# Patient Record
Sex: FEMALE | Race: WHITE
[De-identification: names, ages, dates, MRNs, and addresses within clinical notes are randomized per-mention and may not be internally consistent; named-entity substitution may affect disease eponyms.]

---

## 2018-06-21 ENCOUNTER — HOSPITAL ENCOUNTER (OUTPATIENT)
Dept: HOSPITAL 41 - JD.SDS | Age: 68
Discharge: HOME | End: 2018-06-21
Attending: OPHTHALMOLOGY
Payer: MEDICARE

## 2018-06-21 DIAGNOSIS — H02.834: ICD-10-CM

## 2018-06-21 DIAGNOSIS — Z79.82: ICD-10-CM

## 2018-06-21 DIAGNOSIS — H16.103: ICD-10-CM

## 2018-06-21 DIAGNOSIS — Z79.899: ICD-10-CM

## 2018-06-21 DIAGNOSIS — M19.90: ICD-10-CM

## 2018-06-21 DIAGNOSIS — H25.813: Primary | ICD-10-CM

## 2018-06-21 DIAGNOSIS — H16.223: ICD-10-CM

## 2018-06-21 DIAGNOSIS — Z83.518: ICD-10-CM

## 2018-06-21 DIAGNOSIS — H02.831: ICD-10-CM

## 2018-06-21 DIAGNOSIS — E78.00: ICD-10-CM

## 2018-06-21 DIAGNOSIS — I10: ICD-10-CM

## 2018-06-21 PROCEDURE — 66984 XCAPSL CTRC RMVL W/O ECP: CPT

## 2018-06-21 PROCEDURE — V2632 POST CHMBR INTRAOCULAR LENS: HCPCS

## 2018-06-21 RX ADMIN — TETRACAINE HYDROCHLORIDE SCH ML: 5 SOLUTION OPHTHALMIC at 09:56

## 2018-06-21 RX ADMIN — POLYMYXIN B SULFATE AND TRIMETHOPRIM SULFATE SCH DROP: 10000; 1 SOLUTION/ DROPS OPHTHALMIC at 08:54

## 2018-06-21 RX ADMIN — TROPICAMIDE SCH DROP: 10 SOLUTION/ DROPS OPHTHALMIC at 08:38

## 2018-06-21 RX ADMIN — POLYMYXIN B SULFATE AND TRIMETHOPRIM SULFATE SCH ML: 10000; 1 SOLUTION/ DROPS OPHTHALMIC at 10:05

## 2018-06-21 RX ADMIN — TETRACAINE HYDROCHLORIDE SCH DROP: 5 SOLUTION OPHTHALMIC at 09:35

## 2018-06-21 RX ADMIN — TROPICAMIDE SCH DROP: 10 SOLUTION/ DROPS OPHTHALMIC at 08:28

## 2018-06-21 RX ADMIN — TROPICAMIDE SCH DROP: 10 SOLUTION/ DROPS OPHTHALMIC at 08:48

## 2018-06-21 RX ADMIN — TROPICAMIDE SCH DROP: 10 SOLUTION/ DROPS OPHTHALMIC at 09:10

## 2018-06-21 RX ADMIN — POLYMYXIN B SULFATE AND TRIMETHOPRIM SULFATE SCH DROP: 10000; 1 SOLUTION/ DROPS OPHTHALMIC at 08:13

## 2018-06-21 NOTE — PCM.PREANE
Preanesthetic Assessment





- Procedure


Proposed Procedure: 





cataract left





- Anesthesia/Transfusion/Family Hx


Anesthesia History: Prior Anesthesia Reaction


Type of Anesthesia Reaction: Excessive Nausea/Vomiting


Family History of Anesthesia Reaction: No


Transfusion History: No Prior Transfusion(s)





- Review of Systems


General: No Symptoms


Pulmonary: No Symptoms


Cardiovascular: No Symptoms


Gastrointestinal: No Symptoms


Neurological: No Symptoms


Other: Reports: None





- Physical Assessment


NPO Status Date: 06/20/18


NPO Status Time: 22:00


Pulse: 80


O2 Sat by Pulse Oximetry: 94


Respiratory Rate: 16


Blood Pressure: 135/90


Temperature: 97 F


Height: 5 ft 2 in


Weight: 69.4 kg


ASA Class: 2


Mental Status: Alert & Oriented x3


Airway Class: Mallampati = 1


Dentition: Reports: Normal Dentition


Thyro-Mental Finger Breadths: 3


Mouth Opening Finger Breadths: 3


ROM/Head Extension: Full


Lungs: Clear to Auscultation, Normal Respiratory Effort


Cardiovascular: Regular Rate, Regular Rhythm





- Allergies


Allergies/Adverse Reactions: 


 Allergies











Allergy/AdvReac Type Severity Reaction Status Date / Time


 


No Known Allergies Allergy   Verified 06/19/18 11:15














- Blood


Blood Available: No





- Acknowledgements


Anesthesia Type Planned: MAC


Pt an Appropriate Candidate for the Planned Anesthesia: Yes


Alternatives and Risks of Anesthesia Discussed w Pt/Guardian: Yes


Pt/Guardian Understands and Agrees with Anesthesia Plan: Yes





PreAnesthesia Questionnaire


Cardiovascular History: Reports: High Cholesterol, Hypertension


Respiratory History: Reports: None





- Past Surgical History


HEENT Surgical History: Reports: Other (See Below) (tear duct)


GI Surgical History: Reports: Cholecystectomy


Musculoskeletal Surgical History: Reports: Shoulder Surgery, Other (See Below) (

bunionectomy)





- SUBSTANCE USE


Smoking Status *Q: Never Smoker


Tobacco Use Within Last Twelve Months: No


Second Hand Smoke Exposure: No


Days Per Week of Alcohol Use: 7


Number of Drinks Per Day: 1


Total Drinks Per Week: 7


Recreational Drug Use History: No





- HOME MEDS


Home Medications: 


 Home Meds





Aspirin [Adult Low Dose Aspirin EC] 81 mg PO DAILY 06/20/18 [History]


Lisinopril 20 mg PO DAILY 06/20/18 [History]


Multivitamin with Minerals [Multiple Vitamin] 1 tab PO DAILY 06/20/18 [History]


Simvastatin [Zocor] 40 mg PO DAILY 06/20/18 [History]











- CURRENT (IN HOUSE) MEDS


Current Meds: 





 Current Medications





Brimonidine Tartrate (Alphagan 0.2% Ophth Soln)  0 ml EYELF ASDIRECTED YANI


   Stop: 06/21/18 18:00


   Last Admin: 06/21/18 08:18 Dose:  1 drop


Cefuroxime Sodium (Zinacef)  0 mg EYELF ASDIRECTED YANI


   Stop: 06/21/18 18:00


Lidocaine HCl (Xylocaine-Mpf 1%)  10 ml INJECT ASDIRECTED YANI


   Stop: 06/21/18 18:00


Phenylephrine HCl (Rafael-Synephrine 2.5% Ophth Soln)  0 ml EYELF ASDIRECTED YANI


   Stop: 06/21/18 18:00


   Last Admin: 06/21/18 08:23 Dose:  1 drop


Pilocarpine HCl (Pilocar 4% Ophth Soln)  0 ml EYELF ASDIRECTED YANI


   Stop: 06/21/18 18:00


Polymyxin/Trimethoprim Sulfate (Polytrim Ophth Soln)  0 ml EYELF ASDIRECTED YANI


   Stop: 06/21/18 18:00


   Last Admin: 06/21/18 08:13 Dose:  1 drop


Tetracaine HCl (Tetracaine 0.5% Steri-Unit Sol)  0 ml EYELF ASDIRECTED YANI


   Stop: 06/21/18 18:00


Tropicamide (Mydriacyl 1% Ophth Soln)  0 ml EYELF ASDIRECTED YANI


   Stop: 06/21/18 18:00

## 2018-06-21 NOTE — [id number]
Post Anesthesia Note





- EVALUATION WITHIN 48HRS OF ANESTHETIC


Vital Signs in Normal Range: Yes


Patient Participated in Evaluation: Yes


Respiratory Function Stable: Yes


Airway Patent: Yes


Cardiovascular Function Stable: Yes


Hydration Status Stable: Yes


Pain Control Satisfactory: Yes


Nausea and Vomiting Control Satisfactory: Yes


Mental Status Recovered: Yes


Pulse Rate: 73


SaO2: 94


Resp Rate: 16


Temperature: 97 F


Blood Pressure: 131/83

## 2018-07-19 ENCOUNTER — HOSPITAL ENCOUNTER (OUTPATIENT)
Dept: HOSPITAL 41 - JD.SDS | Age: 68
Discharge: HOME | End: 2018-07-19
Attending: OPHTHALMOLOGY
Payer: MEDICARE

## 2018-07-19 DIAGNOSIS — Z79.899: ICD-10-CM

## 2018-07-19 DIAGNOSIS — Z98.42: ICD-10-CM

## 2018-07-19 DIAGNOSIS — Z79.82: ICD-10-CM

## 2018-07-19 DIAGNOSIS — I10: ICD-10-CM

## 2018-07-19 DIAGNOSIS — Z98.890: ICD-10-CM

## 2018-07-19 DIAGNOSIS — Z83.518: ICD-10-CM

## 2018-07-19 DIAGNOSIS — M19.90: ICD-10-CM

## 2018-07-19 DIAGNOSIS — H02.831: ICD-10-CM

## 2018-07-19 DIAGNOSIS — Z96.1: ICD-10-CM

## 2018-07-19 DIAGNOSIS — E78.00: ICD-10-CM

## 2018-07-19 DIAGNOSIS — H16.103: ICD-10-CM

## 2018-07-19 DIAGNOSIS — H02.834: ICD-10-CM

## 2018-07-19 DIAGNOSIS — H16.223: ICD-10-CM

## 2018-07-19 DIAGNOSIS — H25.811: Primary | ICD-10-CM

## 2018-07-19 PROCEDURE — 66984 XCAPSL CTRC RMVL W/O ECP: CPT

## 2018-07-19 PROCEDURE — C1780 LENS, INTRAOCULAR (NEW TECH): HCPCS

## 2018-07-19 RX ADMIN — PHENYLEPHRINE HYDROCHLORIDE SCH DROP: 25 SOLUTION OPHTHALMIC at 11:35

## 2018-07-19 RX ADMIN — PHENYLEPHRINE HYDROCHLORIDE SCH DROP: 25 SOLUTION OPHTHALMIC at 11:28

## 2018-07-19 RX ADMIN — TETRACAINE HYDROCHLORIDE SCH ML: 5 SOLUTION OPHTHALMIC at 12:44

## 2018-07-19 RX ADMIN — POLYMYXIN B SULFATE AND TRIMETHOPRIM SULFATE SCH ML: 10000; 1 SOLUTION/ DROPS OPHTHALMIC at 12:49

## 2018-07-19 RX ADMIN — POLYMYXIN B SULFATE AND TRIMETHOPRIM SULFATE SCH DROP: 10000; 1 SOLUTION/ DROPS OPHTHALMIC at 10:57

## 2018-07-19 RX ADMIN — TETRACAINE HYDROCHLORIDE SCH DROP: 5 SOLUTION OPHTHALMIC at 12:13

## 2018-07-19 RX ADMIN — PHENYLEPHRINE HYDROCHLORIDE SCH DROP: 25 SOLUTION OPHTHALMIC at 11:21

## 2018-07-19 RX ADMIN — PHENYLEPHRINE HYDROCHLORIDE SCH DROP: 25 SOLUTION OPHTHALMIC at 12:05

## 2018-07-19 RX ADMIN — PHENYLEPHRINE HYDROCHLORIDE SCH DROP: 25 SOLUTION OPHTHALMIC at 12:06

## 2018-07-19 RX ADMIN — PHENYLEPHRINE HYDROCHLORIDE SCH DROP: 25 SOLUTION OPHTHALMIC at 11:08

## 2018-07-19 RX ADMIN — PHENYLEPHRINE HYDROCHLORIDE SCH DROP: 25 SOLUTION OPHTHALMIC at 11:14

## 2018-07-19 RX ADMIN — POLYMYXIN B SULFATE AND TRIMETHOPRIM SULFATE SCH DROP: 10000; 1 SOLUTION/ DROPS OPHTHALMIC at 11:50

## 2018-07-19 RX ADMIN — PHENYLEPHRINE HYDROCHLORIDE SCH DROP: 25 SOLUTION OPHTHALMIC at 12:08

## 2018-07-19 RX ADMIN — PHENYLEPHRINE HYDROCHLORIDE SCH DROP: 25 SOLUTION OPHTHALMIC at 11:41

## 2018-07-19 NOTE — [id number]
Post Anesthesia Note





- EVALUATION WITHIN 48HRS OF ANESTHETIC


Vital Signs in Normal Range: Yes


Patient Participated in Evaluation: Yes


Respiratory Function Stable: Yes


Airway Patent: Yes


Cardiovascular Function Stable: Yes


Hydration Status Stable: Yes


Pain Control Satisfactory: Yes


Nausea and Vomiting Control Satisfactory: Yes


Mental Status Recovered: Yes


Pulse Rate: 71


SaO2: 94


Resp Rate: 16


Blood Pressure: 140/82

## 2018-07-19 NOTE — PCM.PREANE
Preanesthetic Assessment





- Anesthesia/Transfusion/Family Hx


Anesthesia History: Prior Anesthesia Reaction


Type of Anesthesia Reaction: Excessive Nausea/Vomiting


Family History of Anesthesia Reaction: No


Transfusion History: No Prior Transfusion(s)





- Review of Systems


General: No Symptoms


Pulmonary: No Symptoms


Cardiovascular: No Symptoms


Gastrointestinal: No Symptoms


Neurological: No Symptoms


Other: Reports: None





- Physical Assessment


NPO Status Date: 07/18/18


NPO Status Time: 22:30


O2 Sat by Pulse Oximetry: 94


Respiratory Rate: 16


Vital Signs: 





 Last Vital Signs











Temp  36.6 C   07/19/18 10:45


 


Pulse  73   07/19/18 10:45


 


Resp  16   07/19/18 10:45


 


BP  143/97 H  07/19/18 10:45


 


Pulse Ox  94 L  07/19/18 10:45











Height: 1.57 m


Weight: 68.946 kg


ASA Class: 2


Mental Status: Alert & Oriented x3


Airway Class: Mallampati = 1


Dentition: Reports: Normal Dentition


Thyro-Mental Finger Breadths: 3


Mouth Opening Finger Breadths: 3


ROM/Head Extension: Full


Lungs: Clear to Auscultation, Normal Respiratory Effort


Cardiovascular: Regular Rate, Regular Rhythm





- Allergies


Allergies/Adverse Reactions: 


 Allergies











Allergy/AdvReac Type Severity Reaction Status Date / Time


 


No Known Allergies Allergy   Verified 07/18/18 11:17














- Acknowledgements


Anesthesia Type Planned: MAC


Pt an Appropriate Candidate for the Planned Anesthesia: Yes


Alternatives and Risks of Anesthesia Discussed w Pt/Guardian: Yes


Pt/Guardian Understands and Agrees with Anesthesia Plan: Yes





PreAnesthesia Questionnaire


Cardiovascular History: Reports: High Cholesterol, Hypertension


Respiratory History: Reports: None





- Past Surgical History


HEENT Surgical History: Reports: Other (See Below) (tear duct)


GI Surgical History: Reports: Cholecystectomy


Musculoskeletal Surgical History: Reports: Shoulder Surgery, Other (See Below) (

bunionectomy)





- HOME MEDS


Home Medications: 


 Home Meds





Aspirin [Adult Low Dose Aspirin EC] 81 mg PO DAILY 06/20/18 [History]


Lisinopril 20 mg PO DAILY 06/20/18 [History]


Multivitamin with Minerals [Multiple Vitamin] 1 tab PO DAILY 06/20/18 [History]


Simvastatin [Zocor] 40 mg PO DAILY 06/20/18 [History]











- CURRENT (IN HOUSE) MEDS


Current Meds: 





 Current Medications





Brimonidine Tartrate (Alphagan 0.2% Ophth Soln)  0 ml EYERT ASDIRECTED YANI


   Stop: 07/19/18 18:00


   Last Admin: 07/19/18 11:01 Dose:  1 drop


Cefuroxime Sodium (Zinacef)  0 mg EYERT ASDIRECTED YANI


   Stop: 07/19/18 18:00


Lidocaine HCl (Xylocaine-Mpf 1%)  0 ml INJECT ASDIRECTED YANI


   Stop: 07/19/18 18:00


Phenylephrine HCl (Rafael-Synephrine 2.5% Ophth Soln)  0 ml EYERT ASDIRECTED YANI


   Stop: 07/19/18 18:00


   Last Admin: 07/19/18 11:14 Dose:  1 drop


Pilocarpine HCl (Pilocar 4% Ophth Soln)  0 ml EYERT ASDIRECTED YANI


   Stop: 07/19/18 18:00


Polymyxin/Trimethoprim Sulfate (Polytrim Ophth Soln)  0 ml EYERT ASDIRECTED YANI


   Stop: 07/19/18 18:00


   Last Admin: 07/19/18 10:57 Dose:  1 drop


Tetracaine HCl (Tetracaine 0.5% Steri-Unit Sol)  0 ml EYERT ASDIRECTED YANI


   Stop: 07/19/18 18:00


Tropicamide (Mydriacyl 1% Ophth Soln)  0 ml EYERT ASDIRECTED YANI


   Stop: 07/19/18 18:00





Discontinued Medications





Phenylephrine HCl (Rafael-Synephrine 2.5% Ophth Soln)  0 ml EYERT ASDIRECTED YANI


   Stop: 07/19/18 18:00

## 2021-03-04 NOTE — [id number]
Post Anesthesia Note





- EVALUATION WITHIN 48HRS OF ANESTHETIC


Vital Signs in Normal Range: Yes


Patient Participated in Evaluation: Yes


Respiratory Function Stable: Yes


Airway Patent: Yes


Cardiovascular Function Stable: Yes


Hydration Status Stable: Yes


Pain Control Satisfactory: Yes


Nausea and Vomiting Control Satisfactory: Yes


Mental Status Recovered: Yes


Vital Signs: 


                                Last Vital Signs











Temp  97.8 F   03/04/21 07:10


 


Pulse  78   03/04/21 07:10


 


Resp  16   03/04/21 07:10


 


BP  130/85   03/04/21 07:10


 


Pulse Ox  97   03/04/21 07:10








0857


120/75


72


20


97.9


95%

## 2021-03-04 NOTE — PCM.PREANE
Preanesthetic Assessment





- Procedure


Proposed Procedure: 





bilateral carpal tunnel release





- Anesthesia/Transfusion/Family Hx


Anesthesia History: Prior Anesthesia Reaction


Family History of Anesthesia Reaction: No


Transfusion History: No Prior Transfusion(s)





- Review of Systems


General: No Symptoms


Pulmonary: No Symptoms


Cardiovascular: No Symptoms


Gastrointestinal: No Symptoms


Neurological: No Symptoms


Other: Reports: None





- Physical Assessment


NPO Status Date: 03/03/21


NPO Status Time: 23:30


Vital Signs: 





130/85


78


97%


16


97.8


Height: 5 ft 2 in


Weight: 72.6 kg


ASA Class: 2


Mental Status: Alert & Oriented x3


Airway Class: Mallampati = 1


Dentition: Reports: Normal Dentition


Thyro-Mental Finger Breadths: 3


Mouth Opening Finger Breadths: 3


ROM/Head Extension: Full


Lungs: Clear to Auscultation, Normal Respiratory Effort


Cardiovascular: Regular Rate, Regular Rhythm





- Allergies


Allergies/Adverse Reactions: 


                                    Allergies











Allergy/AdvReac Type Severity Reaction Status Date / Time


 


No Known Allergies Allergy   Verified 03/03/21 11:57














- Blood


Blood Available: No





- Acknowledgements


Anesthesia Type Planned: NIMA, MAC


Pt an Appropriate Candidate for the Planned Anesthesia: Yes


Alternatives and Risks of Anesthesia Discussed w Pt/Guardian: Yes


Pt/Guardian Understands and Agrees with Anesthesia Plan: Yes





PreAnesthesia Questionnaire


HEENT History: Reports: Other (See Below)


Other HEENT History: TEAR DUCT SURGERY, WEARS GLASSES


Cardiovascular History: Reports: High Cholesterol, Hypertension, Other (See 

Below)


Other Cardiovascular History: PVCS, HEART CATH


Respiratory History: Reports: Sleep Apnea (snores)


Gastrointestinal History: Reports: None


Genitourinary History: Reports: None


OB/GYN History: Reports: None


Musculoskeletal History: Reports: Other (See Below)


Other Musculoskeletal History: bilateral carpal tunnel syndrome, arthritis


Neurological History: Reports: None


Psychiatric History: Reports: None


Endocrine/Metabolic History: Reports: Other (See Below)


Other Endocrine/Metabolic History: right bunionectomy, right foot surgery, 

bilateral knee replacements, right rotator cuff repair


Hematologic History: Reports: None


Immunologic History: Reports: None


Oncologic (Cancer) History: Reports: None


Dermatologic History: Reports: None





- Infectious Disease History


Infectious Disease History: Reports: None





- Past Surgical History


Head Surgeries/Procedures: Reports: None


HEENT Surgical History: Reports: Cataract Surgery, Other (See Below)


Cardiovascular Surgical History: Reports: None


Respiratory Surgical History: Reports: None


GI Surgical History: Reports: Cholecystectomy, Colonoscopy


Female  Surgical History: Reports: None


Male  Surgical History: Reports: None


Endocrine Surgical History: Reports: None


Neurological Surgical History: Reports: None


Musculoskeletal Surgical History: Reports: Knee Replacement, Shoulder Surgery, 

Other (See Below) (fusion right foot)


Oncologic Surgical History: Reports: None





- SUBSTANCE USE


Tobacco Use Status *Q: Never Tobacco User


Tobacco Use Within Last Twelve Months: No


Second Hand Smoke Exposure: No


Days Per Week of Alcohol Use: 2


Number of Drinks Per Day: 1


Total Drinks Per Week: 2


Recreational Drug Use History: No





- HOME MEDS


Home Medications: 


                                    Home Meds





Aspirin [Adult Low Dose Aspirin EC] 81 mg PO DAILY 06/20/18 [History]


Lisinopril 20 mg PO DAILY 06/20/18 [History]


Multivitamin with Minerals [Multiple Vitamin] 1 tab PO DAILY 06/20/18 [History]


Simvastatin [Zocor] 40 mg PO DAILY 06/20/18 [History]


Ascorbic Acid [Vitamin C] 1,000 mg PO DAILY 03/03/21 [History]


Biotin 1 mg PO DAILY 03/03/21 [History]


Calcium Carb/Vitamin D3/Vit K1 [Calcium + D Soft Chewable Tab] 1 tab PO DAILY 

03/03/21 [History]


Cholecalciferol (Vitamin D3) [Vitamin D3] 1,000 unit PO DAILY 03/03/21 [History]


Fish Oil/Omega-3 Fatty Acids [Fish Oil 1,000 MG] 1 gm PO DAILY 03/03/21 

[History]


Melatonin 10 mg PO DAILY 03/03/21 [History]


Triamcinolone Acetonide [Triamcinolone Acetonide 0.1% Crm] 1 dose TOP BID PRN 

03/03/21 [History]


Ubidecarenone [Coq-10] 100 mg PO DAILY 03/03/21 [History]


Vitamin B Complex [B Complex] 1 tab PO DAILY 03/03/21 [History]











- CURRENT (IN HOUSE) MEDS


Current Meds: 





                               Current Medications





Lactated Ringer's (Ringers, Lactated)  1,000 mls @ 125 mls/hr IV ASDIRECTED YANI


   Stop: 03/04/21 23:00


Lidocaine/Sodium Bicarbonate (Buffered Lidocaine 1% In Ns 8.4%)  0.25 ml IDERM 

ONETIME PRN


   PRN Reason: Prior to IV Start


   Stop: 03/04/21 18:00


Sodium Chloride (Saline Flush)  10 ml FLUSH ASDIRECTED PRN


   PRN Reason: Keep Vein Open


   Stop: 03/04/21 18:00





Discontinued Medications





Cefazolin Sodium (Ancef) Confirm Administered Dose 2 gm .ROUTE .STK-MED ONE


   Stop: 03/04/21 07:09


Fentanyl (Sublimaze) Confirm Administered Dose 100 mcg .ROUTE .STK-MED ONE


   Stop: 03/04/21 07:05


Lidocaine HCl (Xylocaine-Mpf 1%) Confirm Administered Dose 4 mls @ as directed 

.ROUTE .STK-MED ONE


   Stop: 03/04/21 07:05


Midazolam HCl (Versed 1 Mg/Ml) Confirm Administered Dose 2 mg .ROUTE .STK-MED 

ONE


   Stop: 03/04/21 07:05


Propofol (Diprivan  20 Ml) Confirm Administered Dose 200 mg .ROUTE .STK-MED ONE


   Stop: 03/04/21 07:05

## 2021-03-05 NOTE — OR
DATE OF OPERATION:  03/04/2021

 

SURGEON:  Scot Dejesus MD

 

PREOPERATIVE DIAGNOSIS:

1. Right carpal tunnel syndrome, G56.01.

2. Left carpal tunnel syndrome, G56.02.

 

POSTOPERATIVE DIAGNOSIS:

1. Right carpal tunnel syndrome, G56.01.

2. Left carpal tunnel syndrome, G56.02.

 

OPERATION PERFORMED:

1. Right open carpal tunnel release, 30882.

2. Left carpal tunnel injection, 20526.

 

FIRST ASSISTANT:

Valerie London.

 

DESCRIPTION OF PROCEDURE:

The patient is a very pleasant 70-year-old female with symptomatic bilateral

carpal tunnel syndrome.  After discussing the risks, benefits, and alternatives

to both conservative as well as surgical treatment, the patient verbalized

understanding and wished to proceed with surgery.

 

The patient was brought to the operating room and underwent a hermila blockade on

the right side.  The right upper extremity was then prepped and draped in

standard orthopedic fashion.  Surgical pause was performed identifying the

appropriate patient. Sharp dissection was carried out through skin and

subcutaneous tissue.  Hemostasis was obtained.  We divided the palmar fascial

fibers down to the transverse carpal ligament.  Utilizing a [place] blade, we

opened the ulnar border of the transverse carpal ligament.  We then utilized the

dissecting scissors.  We split the transverse carpal ligament down to the level

of the superficial palmar arch.  We turned our attention proximally where we

released the remainder of the transverse carpal ligament.  We then elevated the

soft tissues over the volar aspect of the antebrachial fascia.  We then split

the fascia 3 to 4 cm proximal to the wrist crease.  Exam of the carpal tunnel

again showed fusiform compression at the level of the transverse carpal ligament

- antebrachial fascia junction.  She had a modest synovitis.  Skin was closed

with 5-0 nylon.  She was placed in a soft dressing and brought to Recovery in

satisfactory condition.

 

Her left wrist was sterilely prepped.  We then sterilely injected 1 mL of

[place], 3 mL of 0.25% bupivacaine under sterile conditions without

complication to the carpal tunnel.

 

ANESTHESIA:

 

 

ESTIMATED BLOOD LOSS:

 

 

DD:  03/04/2021 13:49:03

DT:  03/04/2021 18:57:49  MMODAL

Job #:  [id number]

## 2021-11-04 NOTE — PCM.PREANE
Preanesthetic Assessment





- Procedure


Proposed Procedure: 





Left carpal tunnel release








- Anesthesia/Transfusion/Family Hx


Anesthesia History: Prior Anesthesia Reaction (nausea)


Family History of Anesthesia Reaction: No


Transfusion History: No Prior Transfusion(s)





- Review of Systems


General: No Symptoms


Pulmonary: No Symptoms


Cardiovascular: No Symptoms


Gastrointestinal: No Symptoms


Neurological: Numbness (carpal tunnel syndrome)


Other: Reports: None





- Physical Assessment


NPO Status Date: 11/03/21


NPO Status Time: 21:00


Height: 1.57 m


Weight: 71 kg


ASA Class: 3





- Allergies


Allergies/Adverse Reactions: 


                                    Allergies











Allergy/AdvReac Type Severity Reaction Status Date / Time


 


No Known Allergies Allergy   Verified 11/03/21 12:49














PreAnesthesia Questionnaire


HEENT History: Reports: Cataract, Other (See Below)


Other HEENT History: TEAR DUCT SURGERY, WEARS GLASSES


Cardiovascular History: Reports: High Cholesterol, Hypertension, Other (See 

Below)


Other Cardiovascular History: PVCS, HEART CATH


Respiratory History: Reports: None


Gastrointestinal History: Reports: None


Genitourinary History: Reports: None


OB/GYN History: Reports: None


Musculoskeletal History: Reports: Arthritis, Other (See Below)


Other Musculoskeletal History: bilateral carpal tunnel syndrome, arthritis


Neurological History: Reports: None


Psychiatric History: Reports: None


Endocrine/Metabolic History: Reports: Other (See Below)


Other Endocrine/Metabolic History: right bunionectomy, right foot surgery, 

bilateral knee replacements, right rotator cuff repair


Hematologic History: Reports: None


Immunologic History: Reports: None


Oncologic (Cancer) History: Reports: None


Dermatologic History: Reports: None





- Infectious Disease History


Infectious Disease History: Reports: None





- Past Surgical History


Head Surgeries/Procedures: Reports: None


HEENT Surgical History: Reports: Cataract Surgery, Other (See Below)


Other HEENT Surgeries/Procedures: tear duct surgery


Cardiovascular Surgical History: Reports: None


Respiratory Surgical History: Reports: None


GI Surgical History: Reports: Cholecystectomy, Colonoscopy


Female  Surgical History: Reports: None


Male  Surgical History: Reports: None


Endocrine Surgical History: Reports: None


Neurological Surgical History: Reports: None


Musculoskeletal Surgical History: Reports: Carpal Tunnel, Knee Replacement, 

Shoulder Surgery, Other (See Below)


Other Musculoskeletal Surgeries/Procedures:: bunionectomy, foot surgery, knee 

replacements


Oncologic Surgical History: Reports: None


Dermatological Surgical History: Reports: None





- SUBSTANCE USE


Tobacco Use Status *Q: Never Tobacco User


Recreational Drug Use History: No





- HOME MEDS


Home Medications: 


                                    Home Meds





Lisinopril 20 mg PO DAILY 06/20/18 [History]


Multivitamin with Minerals [Multiple Vitamin] 1 tab PO DAILY 06/20/18 [History]


Simvastatin [Zocor] 40 mg PO DAILY 06/20/18 [History]


Ascorbic Acid [Vitamin C] 1,000 mg PO DAILY 03/03/21 [History]


Biotin 1 mg PO DAILY 03/03/21 [History]


Calcium Carb/Vitamin D3/Vit K1 [Calcium + D Soft Chewable Tab] 1 tab PO DAILY 

03/03/21 [History]


Cholecalciferol (Vitamin D3) [Vitamin D3] 1,000 unit PO DAILY 03/03/21 [History]


Fish Oil/Omega-3 Fatty Acids [Fish Oil 1,000 MG] 1 gm PO DAILY 03/03/21 

[History]


Melatonin 10 mg PO BEDTIME 03/03/21 [History]


Ubidecarenone [Coq-10] 100 mg PO DAILY 03/03/21 [History]


Vitamin B Complex [B Complex] 1 tab PO DAILY 03/03/21 [History]


Amoxicillin 2,000 mg PO ASDIRECTED 11/03/21 [History]











- CURRENT (IN HOUSE) MEDS


Current Meds: 





                               Current Medications





Lactated Ringer's (Ringers, Lactated)  1,000 mls @ 125 mls/hr IV ASDIRECTED [facility]


Lidocaine/Sodium Bicarbonate (Lidocaine 1%/Sod Bicarbonate In Ns 8.4% 1 Ml 

Syringe)  0.25 ml IDERM ONETIME PRN


   PRN Reason: Prior to IV Start


Sodium Chloride (Sodium Chloride 0.9% 10 Ml Syringe)  10 ml FLUSH ASDIRECTED PRN


   PRN Reason: Keep Vein Open

## 2021-11-17 NOTE — OR
DATE OF OPERATION:  11/04/2021

 

SURGEON:  Scot Dejesus MD

 

PREOPERATIVE DIAGNOSIS:

Left carpal tunnel syndrome, G56.01.

 

POSTOPERATIVE DIAGNOSIS:

Left carpal tunnel syndrome, G56.01.

 

OPERATION PERFORMED:  Left open carpal tunnel release, 63781.

 

FIRST ASSISTANT:

Vicki London.

 

ANESTHESIA:

[place] block.

 

DESCRIPTION OF PROCEDURE:

The patient is a pleasant 71-year-old with symptomatic left carpal tunnel

syndrome.  After discussing risks, benefits, and alternatives, both conservative

as well as surgical treatment, the patient verbalized understanding and wished

to proceed with surgery.  The patient was brought to the operating room and

underwent Lee blockade.  Left upper extremity was prepped and draped in

standard orthopedic fashion.  Surgical pause was performed identifying the

appropriate patient and appropriate extremity to be operated upon.  Preoperative

antibiotics were given.  A small incision was made between thenar-hypothenar

eminence.  Sharp dissection was carried down to the skin and subcutaneous

tissue.  Hemostasis was obtained.  Dissected down, incised the palmar fascia

down to the transverse carpal ligament. Utilizing a [place] blade, we opened the

transverse carpal ligament along its ulnar border.  We then split the transverse

carpal ligament down to the superficial palmar arch.  We turned our attention

proximally.  We released remainder of the transverse carpal ligament and 2 to 3

cm of brachial fascia proximally.  Contents of the carpal tunnel were evaluated.

She moderate synovitis and it appears there was fusiform compression of the

nerve at the junction between the transverse carpal ligament and antebrachial

fascia.  Otherwise, no anomalies were noted.  Wound was irrigated.  Skin was

closed with 5-0 nylon.  She was placed in a soft dressing and brought to the

recovery in satisfactory condition.

 

ESTIMATED BLOOD LOSS:

 

 

DD:  11/17/2021 08:22:30

DT:  11/17/2021 15:26:11  MMODAL

Job #:  [id number]